# Patient Record
Sex: FEMALE | Race: BLACK OR AFRICAN AMERICAN | NOT HISPANIC OR LATINO
[De-identification: names, ages, dates, MRNs, and addresses within clinical notes are randomized per-mention and may not be internally consistent; named-entity substitution may affect disease eponyms.]

---

## 2019-10-01 PROBLEM — Z00.00 ENCOUNTER FOR PREVENTIVE HEALTH EXAMINATION: Status: ACTIVE | Noted: 2019-10-01

## 2019-10-11 ENCOUNTER — APPOINTMENT (OUTPATIENT)
Dept: COLORECTAL SURGERY | Facility: CLINIC | Age: 29
End: 2019-10-11
Payer: COMMERCIAL

## 2019-10-11 VITALS
TEMPERATURE: 98.3 F | HEART RATE: 65 BPM | SYSTOLIC BLOOD PRESSURE: 133 MMHG | BODY MASS INDEX: 19.25 KG/M2 | DIASTOLIC BLOOD PRESSURE: 92 MMHG | WEIGHT: 127 LBS | HEIGHT: 68 IN

## 2019-10-11 DIAGNOSIS — Z82.0 FAMILY HISTORY OF EPILEPSY AND OTHER DISEASES OF THE NERVOUS SYSTEM: ICD-10-CM

## 2019-10-11 DIAGNOSIS — Z80.3 FAMILY HISTORY OF MALIGNANT NEOPLASM OF BREAST: ICD-10-CM

## 2019-10-11 DIAGNOSIS — K60.3 ANAL FISTULA: ICD-10-CM

## 2019-10-11 DIAGNOSIS — Z80.0 FAMILY HISTORY OF MALIGNANT NEOPLASM OF DIGESTIVE ORGANS: ICD-10-CM

## 2019-10-11 DIAGNOSIS — Z72.3 LACK OF PHYSICAL EXERCISE: ICD-10-CM

## 2019-10-11 DIAGNOSIS — K61.39 OTHER ISCHIORECTAL ABSCESS: ICD-10-CM

## 2019-10-11 PROCEDURE — 99203 OFFICE O/P NEW LOW 30 MIN: CPT

## 2019-10-16 NOTE — CONSULT LETTER
[Dear  ___] : Dear  [unfilled], [Please see my note below.] : Please see my note below. [Consult Closing:] : Thank you very much for allowing me to participate in the care of this patient.  If you have any questions, please do not hesitate to contact me. [Sincerely,] : Sincerely, [FreeTextEntry2] : [facility]\par [address]\[place] [address]\par  [FreeTextEntry3] : YOSVANY SOLORIO MD

## 2019-10-16 NOTE — ASSESSMENT
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 I have expressed concern that there may be residual fistula disease versus chronic nonhealing wounds and have recommended an MRI for further assessment. Pending results of the MRI further discussion with her primary surgeon would be appropriate regarding further treatment options. I have discussed with her that if residual fistulas identified consideration for fecal diversion/ileostomy creation review of full in alleviating her pain and allowing optimal chances for resolution of this complex fistula disease.

## 2019-10-16 NOTE — HISTORY OF PRESENT ILLNESS
[FreeTextEntry1] : 30 yo F presents for second opinion regarding chronic fistula, referred by Dr. Friedman at [facility] student [facility]\par hx of perirectal nontender lump at age 16, seen by later a few years after and recommended ?reconstructive surgery. Pt declined\par Approximately in 2011, experienced an abscess near left buttocks which drained on own\par Seen by GYN who cauterized area w/ silver nitrate.\par Seen by NP at school and referred to Dr. Varner who completed I&D and seton placement in Summer 2014, then closure a few weeks later.\par Pt had been asymptomatic for a few years, however experienced lump in same location in 1/2018. Seen by surgeon again who reported exam was otherwise normal. Area worsened, seen by surgeon again who completed I&D 3/2018. hx of penrose drain placement and placement of seton in 4/2018 and "closure" ?fistulotomy in 6/2018. \par Pt reports several procedures since. Setons removed August 2019, still has two drains in place\par Revied outside medical record, most recently seen by Dr. Varner 10/2 w/ Malecott drain placed.\par No imaging completed\par Pt has been taking Percocet 10/325 daily as needed w/ ibuprofen for constant sharp pain at former site\par Denies fever or change in BH\par \par Area continually drains light yellow color, occasional pink tinged or darker color w/ odor\par Patient was concerned about drainage odor and requested antibiotics. \par Currently on Flagyl 250 mg TID and Levaquin 500 mg once daily x 14 days w/ refill\par Also reports random release of odor from site, denies feeling passing gas through opening.\par \par BH: few times a day, occasional straining or constipation related to taking Percocet \par Never had a colonoscopy\par Father diagnosed w/ stage I colon cancer at age 54/55. Mother w/ breast cancer. \par Denies [facility] IBD\par

## 2019-10-16 NOTE — PHYSICAL EXAM
[Normal] : was normal [None] : there was no rectal mass  [de-identified] : Extensive scarring with residual left lateral/anterior wound of the anal margin. Malecot drain in place. Healthy granulation tissue noted. No feculent discharge.

## 2019-10-26 ENCOUNTER — APPOINTMENT (OUTPATIENT)
Dept: MRI IMAGING | Facility: CLINIC | Age: 29
End: 2019-10-26